# Patient Record
Sex: MALE | Race: WHITE
[De-identification: names, ages, dates, MRNs, and addresses within clinical notes are randomized per-mention and may not be internally consistent; named-entity substitution may affect disease eponyms.]

---

## 2019-12-03 ENCOUNTER — HOSPITAL ENCOUNTER (EMERGENCY)
Dept: HOSPITAL 62 - ER | Age: 54
Discharge: HOME | End: 2019-12-03
Payer: COMMERCIAL

## 2019-12-03 VITALS — DIASTOLIC BLOOD PRESSURE: 66 MMHG | SYSTOLIC BLOOD PRESSURE: 119 MMHG
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
## 2019-12-03 DIAGNOSIS — S05.92XA: Primary | ICD-10-CM

## 2019-12-03 DIAGNOSIS — Z86.718: ICD-10-CM

## 2019-12-03 DIAGNOSIS — F17.200: ICD-10-CM
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
## 2019-12-03 DIAGNOSIS — H11.32: ICD-10-CM

## 2019-12-03 DIAGNOSIS — I10: ICD-10-CM

## 2019-12-03 DIAGNOSIS — I25.2: ICD-10-CM

## 2019-12-03 DIAGNOSIS — I25.10: ICD-10-CM

## 2019-12-03 PROCEDURE — 99283 EMERGENCY DEPT VISIT LOW MDM: CPT

## 2019-12-03 NOTE — ER DOCUMENT REPORT
HPI





- HPI


Time Seen by Provider: 12/03/19 17:36


Pain Level: 4


Context: 





54-year-old male presents the emergency department with a left eye injury 

sustained at work just prior to arrival.  Patient was putting [employer] 

decorations on a shelf when he fell and struck him in the eye.  He is to the 

object had a point to it.  Patient complains of acute pain, mildly blurry 

vision, but no eye entrapment.  Patient does not wear corrective lenses.





- REPRODUCTIVE


Reproductive: DENIES: Pregnant:





Past Medical History





- Social History


Smoking Status: Current Every Day Smoker


Chew tobacco use (# tins/day): No


Frequency of alcohol use: Occasional


Drug Abuse: None


Family History: Arthritis, CAD, COPD, CVA, Hyperlipidemia, Hypertension, 

Malignancy


Patient has suicidal ideation: No


Patient has homicidal ideation: No





- Past Medical History


Cardiac Medical History: Reports: Hx Coronary Artery Disease, Hx DVT - 2016, Hx 

Heart Attack, Hx Hypertension


   Denies: Hx Atrial Fibrillation, Hx Congestive Heart Failure, Hx 

Hypercholesterolemia, Hx Pulmonary Embolism


Pulmonary Medical History: 


   Denies: Hx Asthma, Hx COPD, Hx Sleep Apnea


Neurological Medical History: Denies: Hx Seizures


Endocrine Medical History: Denies: Hx Diabetes Mellitus Type 1, Hx Diabetes 

Mellitus Type 2, Hx Hyperthyroidism, Hx Hypothyroidism


Renal/ Medical History: Denies: Hx Peritoneal Dialysis


GI Medical History: Reports: Hx Gastroesophageal Reflux Disease, Hx Colonoscopy,

Hx Endoscopy.  Denies: Hx Cirrhosis, Hx Hepatitis


Musculoskeletal Medical History: Reports Hx Arthritis, Reports Hx 

Musculoskeletal Trauma - Fracture hand bilateral toes  foot nose


Psychiatric Medical History: 


   Denies: Hx Depression


Traumatic Medical History: Reports: Hx Fractures - Fracture hand bilateral toes 

foot nose


Infectious Medical History: Denies: Hx Hepatitis


Past Surgical History: Reports: Hx Appendectomy, Hx Cardiac Catheterization, Hx 

Orthopedic Surgery - R foot/knee, L hand, R arm, Hx Tonsillectomy, Other - 

Removal of foreign body from my.  Denies: Hx Pacemaker





- Immunizations


Immunizations up to date: No - tetnus given in ED


Hx Diphtheria, Pertussis, Tetanus Vaccination: Yes





Vertical Provider Document





- CONSTITUTIONAL


Notes: 





PHYSICAL EXAMINATION:





Reviewed vital signs and charting by RN





GENERAL: Alert, interacts well. No acute distress.


HEAD: Normocephalic, atraumatic.


EYES: Pupils equal and round. Extraocular movements intact.  Subconjunctival 

hemorrhage on the lateral aspect of the left sclera at approximately the 2:00 to

5 o'clock position, no evidence of hypo-pion


ENT: Oral mucosa moist, tongue midline. 


NECK: Full range of motion. Trachea midline.


PSYCH: Normal affect, normal mood.


SKIN: Warm, dry, normal turgor. No rashes or lesions noted.








- INFECTION CONTROL


TRAVEL OUTSIDE OF THE [place] IN LAST 30 DAYS: No





Course





- Re-evaluation


Re-evalutation: 





12/03/19 18:29


Tetracaine drops applied and patient with significant relief.  Inverted eyelid 

and there was no evidence of foreign body or laceration, floor seen stain 

applied and Woods lamp revealed a small corneal abrasion at the 7 o'clock 

position and a subconjunctival hemorrhage on the lateral aspect from the 2 to 5 

o'clock position.  Plan is to give him erythromycin eye ointment and follow-up 

with ophthalmology tomorrow.  Strict return precautions given stable for 

discharge.





- Vital Signs


Vital signs: 


                                        











Temp Pulse Resp BP Pulse Ox


 


 98.0 F   62   16   139/89 H  96 


 


 12/03/19 17:35  12/03/19 17:35  12/03/19 17:35  12/03/19 17:35  12/03/19 17:35














Discharge





- Discharge


Clinical Impression: 


 Subconjunctival hemorrhage of left eye





Left eye injury


Qualifiers:


 Encounter type: initial encounter Qualified Code(s): S05.92XA - Unspecified 

injury of left eye and orbit, initial encounter





Condition: Good


Disposition: HOME, SELF-CARE


Additional Instructions: 


You have a corneal abrasion and a subconjunctival hemorrhage.  This should 

improve in the next several days.  You should apply 1 cm of the eye ointment to 

the affected up to 6 times per day.  I have given you information for the 

ophthalmologist will call him tomorrow for follow-up.  Return if you have 

decreased vision, worsening pain, increased drainage from the eye, you notice 

redness or puffiness around the eye, you develop a fever greater than 101F, or 

you have any other symptoms that are concerning to you.


Referrals: 


BALTA PIÑA FNP-BC [Primary Care Provider] - Follow up as needed


ALLYSSA PIERRE MD [ACTIVE STAFF] - Follow up tomorrow

## 2020-10-19 ENCOUNTER — HOSPITAL ENCOUNTER (EMERGENCY)
Dept: HOSPITAL 62 - ER | Age: 55
LOS: 1 days | Discharge: LEFT BEFORE DISCHARGE | End: 2020-10-20
Payer: COMMERCIAL

## 2020-10-19 VITALS — DIASTOLIC BLOOD PRESSURE: 82 MMHG | SYSTOLIC BLOOD PRESSURE: 130 MMHG

## 2020-10-19 DIAGNOSIS — M79.605: ICD-10-CM

## 2020-10-19 DIAGNOSIS — Z79.01: ICD-10-CM

## 2020-10-19 DIAGNOSIS — M79.672: ICD-10-CM

## 2020-10-19 DIAGNOSIS — I10: ICD-10-CM

## 2020-10-19 DIAGNOSIS — I25.10: ICD-10-CM

## 2020-10-19 DIAGNOSIS — Z53.20: Primary | ICD-10-CM

## 2020-10-19 LAB
ADD MANUAL DIFF: NO
ALBUMIN SERPL-MCNC: 4.5 G/DL (ref 3.5–5)
ALP SERPL-CCNC: 62 U/L (ref 38–126)
ANION GAP SERPL CALC-SCNC: 9 MMOL/L (ref 5–19)
APTT BLD: 28.4 SEC (ref 23.5–35.8)
AST SERPL-CCNC: 37 U/L (ref 17–59)
BASOPHILS # BLD AUTO: 0 10^3/UL (ref 0–0.2)
BASOPHILS NFR BLD AUTO: 0.3 % (ref 0–2)
BILIRUB DIRECT SERPL-MCNC: 0.4 MG/DL (ref 0–0.4)
BILIRUB SERPL-MCNC: 0.8 MG/DL (ref 0.2–1.3)
BUN SERPL-MCNC: 13 MG/DL (ref 7–20)
CALCIUM: 9.6 MG/DL (ref 8.4–10.2)
CHLORIDE SERPL-SCNC: 102 MMOL/L (ref 98–107)
CO2 SERPL-SCNC: 28 MMOL/L (ref 22–30)
EOSINOPHIL # BLD AUTO: 0.2 10^3/UL (ref 0–0.6)
EOSINOPHIL NFR BLD AUTO: 1.7 % (ref 0–6)
ERYTHROCYTE [DISTWIDTH] IN BLOOD BY AUTOMATED COUNT: 14.7 % (ref 11.5–14)
GLUCOSE SERPL-MCNC: 89 MG/DL (ref 75–110)
HCT VFR BLD CALC: 45.3 % (ref 37.9–51)
HGB BLD-MCNC: 15.4 G/DL (ref 13.5–17)
INR PPP: 1.01
LYMPHOCYTES # BLD AUTO: 3.8 10^3/UL (ref 0.5–4.7)
LYMPHOCYTES NFR BLD AUTO: 33.1 % (ref 13–45)
MCH RBC QN AUTO: 31.5 PG (ref 27–33.4)
MCHC RBC AUTO-ENTMCNC: 33.9 G/DL (ref 32–36)
MCV RBC AUTO: 93 FL (ref 80–97)
MONOCYTES # BLD AUTO: 1 10^3/UL (ref 0.1–1.4)
MONOCYTES NFR BLD AUTO: 8.6 % (ref 3–13)
NEUTROPHILS # BLD AUTO: 6.5 10^3/UL (ref 1.7–8.2)
NEUTS SEG NFR BLD AUTO: 56.3 % (ref 42–78)
PLATELET # BLD: 277 10^3/UL (ref 150–450)
POTASSIUM SERPL-SCNC: 4.3 MMOL/L (ref 3.6–5)
PROT SERPL-MCNC: 7.4 G/DL (ref 6.3–8.2)
PROTHROMBIN TIME: 13.5 SEC (ref 11.4–15.4)
RBC # BLD AUTO: 4.88 10^6/UL (ref 4.35–5.55)
TOTAL CELLS COUNTED % (AUTO): 100 %
WBC # BLD AUTO: 11.5 10^3/UL (ref 4–10.5)

## 2020-10-19 PROCEDURE — 85730 THROMBOPLASTIN TIME PARTIAL: CPT

## 2020-10-19 PROCEDURE — 99281 EMR DPT VST MAYX REQ PHY/QHP: CPT

## 2020-10-19 PROCEDURE — 80053 COMPREHEN METABOLIC PANEL: CPT

## 2020-10-19 PROCEDURE — 93971 EXTREMITY STUDY: CPT

## 2020-10-19 PROCEDURE — 85025 COMPLETE CBC W/AUTO DIFF WBC: CPT

## 2020-10-19 PROCEDURE — 85610 PROTHROMBIN TIME: CPT

## 2020-10-19 PROCEDURE — 36415 COLL VENOUS BLD VENIPUNCTURE: CPT

## 2020-10-19 NOTE — RADIOLOGY REPORT (SQ)
EXAM DESCRIPTION:  FOOT LEFT COMPLETE



IMAGES COMPLETED DATE/TIME:  10/19/2020 5:30 pm



REASON FOR STUDY:  foot pain



COMPARISON:  None.



NUMBER OF VIEWS:  Three views.



TECHNIQUE:  AP, lateral and oblique  radiographic images acquired of the left foot.



LIMITATIONS:  None.



FINDINGS:  MINERALIZATION: Normal.

BONES: No acute fracture or dislocation.  No worrisome bone lesions.  Incidental note is made of norm
al variant accessory navicular.  A tiny ossicle seen at the calcaneonavicular interval may represent 
a degree fibrous coalition.  Calcaneal enthesopathy is demonstrated.

JOINTS: No effusions.

SOFT TISSUES: No soft tissue swelling.  No foreign body.

OTHER: No other significant finding.



IMPRESSION:  No evidence of acute osseous injury or significant degenerative change.  Incidental find
ings detailed above may be related to patient's presenting symptoms.



TECHNICAL DOCUMENTATION:  JOB ID:  [id number]

 2011 [email or web address]- All Rights Reserved



Reading location - IP/workstation name: [email or web address]

## 2020-10-19 NOTE — RADIOLOGY REPORT (SQ)
EXAM DESCRIPTION:  VENOUS UNILATERAL LOWER



IMAGES COMPLETED DATE/TIME:  10/19/2020 7:13 pm



REASON FOR STUDY:   left calf pain, foot pain, hx of DVT



COMPARISON:  None.

3/20/2018



TECHNIQUE:  Dynamic and static gray scale and color images acquired of the left leg venous system. Se
lected spectral images acquired with additional compression and augmentation maneuvers. The contralat
eral common femoral vein and saphenofemoral junction were also imaged. Images stored on PACS.



LIMITATIONS:  None.



FINDINGS:  COMMON FEMORAL: Normal phasicity, compression and augmentation. No visualized echogenic ma
terial on gray scale. No defects on color images.

FEMORAL: Normal compression and augmentation. No visualized echogenic material on gray scale. No defe
cts on color images.

POPLITEAL: Normal compression, augmentation. No visualized echogenic material on gray scale. No defec
ts on color images.

CALF VESSELS: Normal compression, augmentation. No visualized echogenic material on gray scale. No de
fects on color images.

GSV and SSV: Normal compression, augmentation. No visualized echogenic material on gray scale. No def
ects on color images.

ANY DEEP VENOUS INSUFFICIENCY: Not evaluated.

ANY EVIDENCE OF POPLITEAL CYST: No.

OTHER: No other significant finding.

CONTRALATERAL COMMON FEMORAL VEIN AND SAPHENOFEMORAL JUNCTION:

Normal phasicity, compression and augmentation. No visualized echogenic material on gray scale. No de
fects on color images.



IMPRESSION:  NO EVIDENCE DVT OR SVT IN THE LEFT LEG.



TECHNICAL DOCUMENTATION:  JOB ID:  [id number]

 2011 [employer]- All Rights Reserved



Reading location - IP/workstation name: [email or web address]

## 2020-10-31 ENCOUNTER — HOSPITAL ENCOUNTER (EMERGENCY)
Dept: HOSPITAL 62 - ER | Age: 55
LOS: 1 days | Discharge: HOME | End: 2020-11-01
Payer: COMMERCIAL

## 2020-10-31 DIAGNOSIS — I25.10: ICD-10-CM

## 2020-10-31 DIAGNOSIS — F17.200: ICD-10-CM

## 2020-10-31 DIAGNOSIS — I10: ICD-10-CM

## 2020-10-31 DIAGNOSIS — R00.1: ICD-10-CM

## 2020-10-31 DIAGNOSIS — F10.120: ICD-10-CM

## 2020-10-31 DIAGNOSIS — I25.2: ICD-10-CM

## 2020-10-31 DIAGNOSIS — R07.9: Primary | ICD-10-CM

## 2020-10-31 LAB
ADD MANUAL DIFF: NO
ALBUMIN SERPL-MCNC: 4.3 G/DL (ref 3.5–5)
ALP SERPL-CCNC: 46 U/L (ref 38–126)
ANION GAP SERPL CALC-SCNC: 14 MMOL/L (ref 5–19)
AST SERPL-CCNC: 38 U/L (ref 17–59)
BASOPHILS # BLD AUTO: 0 10^3/UL (ref 0–0.2)
BASOPHILS NFR BLD AUTO: 0.1 % (ref 0–2)
BILIRUB DIRECT SERPL-MCNC: 0 MG/DL (ref 0–0.4)
BILIRUB SERPL-MCNC: 0.7 MG/DL (ref 0.2–1.3)
BUN SERPL-MCNC: 11 MG/DL (ref 7–20)
CALCIUM: 8.9 MG/DL (ref 8.4–10.2)
CHLORIDE SERPL-SCNC: 100 MMOL/L (ref 98–107)
CK MB SERPL-MCNC: 8.31 NG/ML (ref ?–4.55)
CO2 SERPL-SCNC: 21 MMOL/L (ref 22–30)
EOSINOPHIL # BLD AUTO: 0.3 10^3/UL (ref 0–0.6)
EOSINOPHIL NFR BLD AUTO: 2.5 % (ref 0–6)
ERYTHROCYTE [DISTWIDTH] IN BLOOD BY AUTOMATED COUNT: 15.2 % (ref 11.5–14)
ETHANOL SERPL-MCNC: 173 MG/DL
GLUCOSE SERPL-MCNC: 74 MG/DL (ref 75–110)
HCT VFR BLD CALC: 42.8 % (ref 37.9–51)
HGB BLD-MCNC: 14.5 G/DL (ref 13.5–17)
LYMPHOCYTES # BLD AUTO: 5.1 10^3/UL (ref 0.5–4.7)
LYMPHOCYTES NFR BLD AUTO: 38.8 % (ref 13–45)
MCH RBC QN AUTO: 31.8 PG (ref 27–33.4)
MCHC RBC AUTO-ENTMCNC: 33.8 G/DL (ref 32–36)
MCV RBC AUTO: 94 FL (ref 80–97)
MONOCYTES # BLD AUTO: 1.3 10^3/UL (ref 0.1–1.4)
MONOCYTES NFR BLD AUTO: 10.2 % (ref 3–13)
NEUTROPHILS # BLD AUTO: 6.3 10^3/UL (ref 1.7–8.2)
NEUTS SEG NFR BLD AUTO: 48.4 % (ref 42–78)
PLATELET # BLD: 265 10^3/UL (ref 150–450)
POTASSIUM SERPL-SCNC: 3.8 MMOL/L (ref 3.6–5)
PROT SERPL-MCNC: 7.4 G/DL (ref 6.3–8.2)
RBC # BLD AUTO: 4.55 10^6/UL (ref 4.35–5.55)
TOTAL CELLS COUNTED % (AUTO): 100 %
TROPONIN I SERPL-MCNC: < 0.012 NG/ML
WBC # BLD AUTO: 13.1 10^3/UL (ref 4–10.5)

## 2020-10-31 PROCEDURE — 83690 ASSAY OF LIPASE: CPT

## 2020-10-31 PROCEDURE — 84484 ASSAY OF TROPONIN QUANT: CPT

## 2020-10-31 PROCEDURE — 82553 CREATINE MB FRACTION: CPT

## 2020-10-31 PROCEDURE — 85025 COMPLETE CBC W/AUTO DIFF WBC: CPT

## 2020-10-31 PROCEDURE — 80307 DRUG TEST PRSMV CHEM ANLYZR: CPT

## 2020-10-31 PROCEDURE — 71045 X-RAY EXAM CHEST 1 VIEW: CPT

## 2020-10-31 PROCEDURE — 80053 COMPREHEN METABOLIC PANEL: CPT

## 2020-10-31 PROCEDURE — 93010 ELECTROCARDIOGRAM REPORT: CPT

## 2020-10-31 PROCEDURE — 99285 EMERGENCY DEPT VISIT HI MDM: CPT

## 2020-10-31 PROCEDURE — 93005 ELECTROCARDIOGRAM TRACING: CPT

## 2020-10-31 PROCEDURE — 36415 COLL VENOUS BLD VENIPUNCTURE: CPT

## 2020-10-31 PROCEDURE — 96361 HYDRATE IV INFUSION ADD-ON: CPT

## 2020-10-31 PROCEDURE — 96374 THER/PROPH/DIAG INJ IV PUSH: CPT

## 2020-10-31 NOTE — RADIOLOGY REPORT (SQ)
EXAM DESCRIPTION: 



XR CHEST 1 VIEW



COMPLETED DATE/TME:  10/31/2020 22:36



CLINICAL HISTORY: chest pain



COMPARISON: None.



FINDINGS: Single frontal radiograph view of the chest. 



Cardiomediastinal silhouette: Normal size and contour. 

Lungs: No consolidation, pneumothorax, or pleural effusion. 

Bones: No acute osseous abnormality. Leads overlie the chest.

Upper abdomen: No abnormality identified.



IMPRESSION:



1. No acute pulmonary process identified.

## 2020-10-31 NOTE — ER DOCUMENT REPORT
ED Cardiac





- General


Chief Complaint: Chest Pain


Stated Complaint: CHEST PAIN


Time Seen by Provider: 10/31/20 22:37


Primary Care Provider: 


FELIPE BRADY DO [Primary Care Provider] - Follow up in 3-5 days


Notes: 


Patient is a 55-year-old male that comes emergency department by EMS for chief 

complaint of chest pain.  Patient states that he was in a group with a Halloween

party drinking alcohol tonight, standing around having conversation when he 

started feeling pain that shot from underneath his left breast all the way up to

his left shoulder.  EMS gave him 324 mg of aspirin and 500 cc bolus of fluids 

and he states he already has no pain and feels much better.  He denies nausea, 

vomiting, dizziness, shortness of breath, fever, cough, abdominal pain.  He 

smokes, he has a history of LISANDRA but not COPD reportedly, he reports history of 

MI and states he had heart catheterization but no stents, has a history of obe

sity, denies medical history otherwise.  He does not currently follow with a 

cardiologist, he sees Dr. Brady.  He denies recreational drugs.  Wife at 

bedside.





TRAVEL OUTSIDE OF THE [place] IN LAST 30 DAYS: No





- Related Data


Allergies/Adverse Reactions: 


                                        





No Known Allergies Allergy (Verified 10/19/20 16:56)


   











Past Medical History





- General


Information source: Patient





- Social History


Smoking Status: Current Every Day Smoker


Frequency of alcohol use: None


Drug Abuse: None


Lives with: Family


Family History: Arthritis, CAD, COPD, CVA, Hyperlipidemia, Hypertension, 

Malignancy





- Past Medical History


Cardiac Medical History: Reports: Hx Coronary Artery Disease, Hx DVT - 2016, Hx 

Heart Attack, Hx Hypertension


   Denies: Hx Atrial Fibrillation, Hx Congestive Heart Failure, Hx Hypercholest

erolemia, Hx Pulmonary Embolism


Pulmonary Medical History: 


   Denies: Hx Asthma, Hx COPD, Hx Sleep Apnea


Neurological Medical History: Denies: Hx Seizures


Endocrine Medical History: Denies: Hx Diabetes Mellitus Type 1, Hx Diabetes 

Mellitus Type 2, Hx Hyperthyroidism, Hx Hypothyroidism


Renal/ Medical History: Denies: Hx Peritoneal Dialysis


GI Medical History: Reports: Hx Gastroesophageal Reflux Disease, Hx Colonoscopy,

Hx Endoscopy.  Denies: Hx Cirrhosis, Hx Hepatitis


Musculoskeletal Medical History: Reports Hx Arthritis, Reports Hx Musculoskel

etal Trauma - Fracture hand bilateral toes  foot nose


Psychiatric Medical History: 


   Denies: Hx Depression


Traumatic Medical History: Reports: Hx Fractures - Fracture hand bilateral toes 

foot nose


Infectious Medical History: Denies: Hx Hepatitis


Past Surgical History: Reports: Hx Appendectomy, Hx Cardiac Catheterization, Hx 

Orthopedic Surgery - R foot/knee, L hand, R arm, Hx Tonsillectomy, Other - 

Removal of foreign body from my.  Denies: Hx Pacemaker





- Immunizations


Immunizations up to date: No - tetnus given in ED


Hx Diphtheria, Pertussis, Tetanus Vaccination: Yes





Review of Systems





- Review of Systems


Constitutional: No symptoms reported


EENT: No symptoms reported


Cardiovascular: See HPI


Respiratory: No symptoms reported


Gastrointestinal: See HPI


Genitourinary: No symptoms reported


Male Genitourinary: No symptoms reported


Musculoskeletal: No symptoms reported


Skin: No symptoms reported


Hematologic/Lymphatic: No symptoms reported


Neurological/Psychological: No symptoms reported





Physical Exam





- Vital signs


Vitals: 


                                        











Resp Pulse Ox


 


 14   98 


 


 10/31/20 22:27  10/31/20 22:27














- Notes


Notes: 





GENERAL: Alert, interacts well. No acute distress.  Occasionally slurring words


HEAD: Normocephalic, atraumatic.


EYES: Pupils equal, round, and reactive to light. Extraocular movements intact.


ENT: Oral mucosa moist, tongue midline. Oropharynx unremarkable. Airway patent.


NECK: Full range of motion. Supple. Trachea midline. No lymphadenopathy.


LUNGS: Clear to auscultation bilaterally, no wheezes, rales, or rhonchi. No 

respiratory distress. Non-tender chest wall. 


HEART: Borderline bradycardic, normal rhythm, no murmur


ABDOMEN: Soft, non-tender. Non-distended. Bowel sounds present in all 4 qu

adrants.


GENITOURINARY: Deferred


EXTREMITIES: Moves all 4 extremities spontaneously. No edema, normal radial and 

dorsalis pedis pulses bilaterally. No cyanosis.


BACK: no cervical, thoracic, lumbar midline tenderness. No saddle anesthesia, 

normal distal neurovascular exam. Moves all extremities in full range of motion.


NEUROLOGICAL: Alert and oriented x3.  Occasionally mildly slurred speech.  Crani

al nerves II through XII grossly intact. Strength 5/5 in all extremities. 


PSYCH: Normal affect, normal mood.


SKIN: Flushed





Course





- Re-evaluation


Re-evalutation: 


Patient indicates he had symptoms from the left upper abdomen up into the chest 

and towards the left shoulder briefly during the episode prior to arrival.  He 

has not had any symptoms since.  He has no complaints on my exam.  He does have 

episodes of bradycardia but on review of previous records this is not out of the

ordinary for the patient and he states this is normal for him.  He fell asleep 

and became mildly hypotensive, however we checked orthostatics and had him 

ambulate and this was unremarkable.  Patient was given IV fluids and then on my 

reevaluation systolic blood pressure is 102 and patient got up and ambulated 

without any difficulty or complaints.





CBC shows mild leukocytosis, nonspecific given his evaluation.  Chest x-ray 

unremarkable.  Chemistry shows glucose of 74, patient was fed.  Troponin 

negative.  CK-MB is slightly elevated and was rechecked and not significantly 

different, troponin cycled and negative.  Patient intoxicated with alcohol 

greater than 170.  Patient also has no tachycardia, hypoxia, shortness of 

breath, or current symptoms suggesting pulmonary embolism.  No lower extremity 

swelling.





Reevaluate patient again.  Patient is not had any symptoms.  Patient no longer 

intoxicated, states he feels much better, discussing going home.  I did discuss 

with Dr. Patel because of his significant history of cardiovascular disease, 

however based on his presentation, work-up, low suspicion that this is cardiac 

or that patient has any acute intrathoracic etiology.  Patient stable to follow-

up with primary care and return for any concerning symptoms.  Discussed 

expectations, follow-up, return precautions.  Patient and wife state 

appreciation and agreement.








- Vital Signs


Vital signs: 


                                        











Temp Pulse Resp BP Pulse Ox


 


 97.6 F   84   18   96/51 L  96 


 


 11/01/20 03:01  11/01/20 02:03  11/01/20 03:01  11/01/20 03:01  11/01/20 03:01














- Laboratory


Result Diagrams: 


                                 10/31/20 22:30





                                 10/31/20 22:36


Laboratory results interpreted by me: 


                                        











  10/31/20 10/31/20 10/31/20





  22:30 22:36 22:36


 


WBC  13.1 H  


 


RDW  15.2 H  


 


Absolute Lymphs (auto)  5.1 H  


 


Sodium   135.1 L 


 


Carbon Dioxide   21 L 


 


Glucose   74 L 


 


CK-MB (CK-2)    8.31 H














  11/01/20





  01:25 EST


 


WBC 


 


RDW 


 


Absolute Lymphs (auto) 


 


Sodium 


 


Carbon Dioxide 


 


Glucose 


 


CK-MB (CK-2)  8.81 H














- EKG Interpretation by Me


Additional EKG results interpreted by me: 


EKG shows sinus bradycardia at a rate of 45, normal axis, , no T wave 

inversions or ST segment changes in consecutive leads





Discharge





- Discharge


Clinical Impression: 


Chest pain


Qualifiers:


 Chest pain type: unspecified Qualified Code(s): R07.9 - Chest pain, unspecified





Alcohol intoxication


Qualifiers:


 Complication of substance-induced condition: uncomplicated Qualified Code(s): 

F10.920 - Alcohol use, unspecified with intoxication, uncomplicated





Condition: Stable


Disposition: HOME, SELF-CARE


Additional Instructions: 


Your work-up does not show any concerning findings tonight.  I suspect your pain

was gastrointestinal, most likely from your stomach bag.  Start with clear 

fluids and bland food, take Zofran today if needed, take the Pepcid if needed, 

avoid alcohol for the next couple of days.  Follow-up with your primary care 

provider for additional management.





Return if you worsen including return of pain, vomiting, difficulty breathing, f

ever, passing out, or any other concerning symptoms.


Prescriptions: 


Famotidine [Pepcid 20 mg Tablet] 20 mg PO BID #20 tablet


Referrals: 


FELIPE BRADY DO [Primary Care Provider] - Follow up in 3-5 days

## 2020-11-01 VITALS — SYSTOLIC BLOOD PRESSURE: 96 MMHG | DIASTOLIC BLOOD PRESSURE: 51 MMHG

## 2020-11-01 NOTE — EKG REPORT
SEVERITY:- ABNORMAL ECG -

SINUS BRADYCARDIA

NONSPECIFIC INTRAVENTRICULAR CONDUCTION DELAY

NONSPECIFIC ST-T CHANGES  DIFFUSE

:

Confirmed by: Mario Ordoñez MD 01-Nov-2020 08:41:02